# Patient Record
Sex: MALE | Race: BLACK OR AFRICAN AMERICAN | NOT HISPANIC OR LATINO | Employment: FULL TIME | URBAN - METROPOLITAN AREA
[De-identification: names, ages, dates, MRNs, and addresses within clinical notes are randomized per-mention and may not be internally consistent; named-entity substitution may affect disease eponyms.]

---

## 2020-09-02 PROBLEM — I82.4Y2 ACUTE DEEP VEIN THROMBOSIS (DVT) OF PROXIMAL VEIN OF LEFT LOWER EXTREMITY: Status: ACTIVE | Noted: 2020-09-02

## 2020-09-02 PROBLEM — I26.99 ACUTE PULMONARY EMBOLISM WITHOUT ACUTE COR PULMONALE: Status: ACTIVE | Noted: 2020-09-02

## 2021-06-08 PROBLEM — Z79.01 ANTICOAGULATED: Status: ACTIVE | Noted: 2021-06-08

## 2021-06-08 PROBLEM — R74.01 TRANSAMINITIS: Status: ACTIVE | Noted: 2021-06-08

## 2021-06-08 PROBLEM — Z72.0 TOBACCO ABUSE: Status: ACTIVE | Noted: 2021-06-08

## 2022-03-13 PROBLEM — K02.9 DENTAL CARIES: Status: ACTIVE | Noted: 2022-03-13

## 2023-01-13 PROBLEM — Z71.6 TOBACCO ABUSE COUNSELING: Status: ACTIVE | Noted: 2023-01-13

## 2023-01-13 PROBLEM — Z80.0 FAMILY HISTORY OF COLON CANCER: Status: ACTIVE | Noted: 2023-01-13

## 2023-01-15 PROBLEM — D68.59 THROMBOPHILIA: Status: ACTIVE | Noted: 2023-01-15

## 2024-03-13 PROBLEM — I82.4Y2 ACUTE DEEP VEIN THROMBOSIS (DVT) OF PROXIMAL VEIN OF LEFT LOWER EXTREMITY: Status: RESOLVED | Noted: 2020-09-02 | Resolved: 2024-03-13

## 2024-03-13 PROBLEM — I26.09 ACUTE PULMONARY EMBOLISM WITH ACUTE COR PULMONALE: Status: ACTIVE | Noted: 2020-09-02

## 2024-03-14 PROBLEM — J96.01 ACUTE HYPOXEMIC RESPIRATORY FAILURE: Status: ACTIVE | Noted: 2024-03-14

## 2024-03-14 PROBLEM — J96.01 ACUTE HYPOXEMIC RESPIRATORY FAILURE: Status: RESOLVED | Noted: 2024-03-14 | Resolved: 2024-03-14

## 2024-03-14 PROBLEM — R07.9 CHEST PAIN: Status: ACTIVE | Noted: 2024-03-14

## 2024-03-14 PROBLEM — D68.59 PROTEIN S DEFICIENCY: Status: RESOLVED | Noted: 2023-01-15 | Resolved: 2024-03-14

## 2024-03-14 PROBLEM — R06.02 SHORTNESS OF BREATH: Status: ACTIVE | Noted: 2024-03-14

## 2024-03-14 PROBLEM — R07.9 CHEST PAIN: Status: RESOLVED | Noted: 2024-03-14 | Resolved: 2024-03-14

## 2024-03-15 PROBLEM — R06.02 SHORTNESS OF BREATH: Status: ACTIVE | Noted: 2024-03-15

## 2024-06-17 PROBLEM — I26.09 ACUTE PULMONARY EMBOLISM WITH ACUTE COR PULMONALE: Status: RESOLVED | Noted: 2020-09-02 | Resolved: 2024-06-17

## 2024-07-07 PROBLEM — I50.810 RIGHT-SIDED HEART FAILURE: Status: ACTIVE | Noted: 2024-07-07

## 2024-07-07 PROBLEM — I50.813 ACUTE ON CHRONIC RIGHT-SIDED HEART FAILURE: Status: ACTIVE | Noted: 2024-07-07

## 2024-07-07 PROBLEM — I27.20 PULMONARY HYPERTENSION: Status: ACTIVE | Noted: 2024-07-07

## 2024-07-12 PROBLEM — J96.01 ACUTE RESPIRATORY FAILURE WITH HYPOXIA: Status: RESOLVED | Noted: 2024-03-14 | Resolved: 2024-07-12

## 2024-08-08 ENCOUNTER — SOCIAL WORK (OUTPATIENT)
Dept: ADMINISTRATIVE | Facility: OTHER | Age: 54
End: 2024-08-08

## 2024-08-08 NOTE — PROGRESS NOTES
Was asked by MD to assist pt with Eliquis medication due to pt having no insurance and he reports that medication is costing him $600.00 a month. Informed MD that Sw can meet with pt and try to enroll him in a Patient Assistance Program to obtain assistance with medication. Received office visit from pt and discussed with pt about getting him assistance with the Eliquis medication through the [employer] Patient Assistance Program and pt agreed to be assisted. Pt signed the enrollment application and pt was informed that once MD signature is obtained on application it will be submitted to [employer] for review and he will be updated on the response. Pt verbalized understanding. Obtained MD signature on application and faxed to [employer] [phone] for assistance. Will continue to follow pt and assist pt in obtaining assistance with the Eliquis medication.       Alexandra Gupta LMSW    Ext [phone]/Pager [phone]

## 2024-08-12 ENCOUNTER — SOCIAL WORK (OUTPATIENT)
Dept: ADMINISTRATIVE | Facility: OTHER | Age: 54
End: 2024-08-12

## 2024-08-12 NOTE — PROGRESS NOTES
Called [facility] Patient [employer]@[phone], spoke with Diane to follow up with her in regards to receiving pt new application for assistance with the Eliquis and she confirmed that application was received. Diane states they received all information they need and it application will be assigned to a processor. Diane if they have any questions they will reach out. Diane reports that once a determination is made they will fax a letter to the provider office and mail letter to pt. Called [phone] was notified of [employer] receiving his application and it will be reviewed. Informed pt that Sw will call him once a response is received regarding the application. Pt verbalized understanding. No other needs were noted at that time. Will continue to follow pt and assist.       Alexandra Gupta LMSW    Ext [phone]/Pager [phone]

## 2024-08-13 ENCOUNTER — SOCIAL WORK (OUTPATIENT)
Dept: ADMINISTRATIVE | Facility: OTHER | Age: 54
End: 2024-08-13

## 2024-08-13 NOTE — PROGRESS NOTES
Received letter via fax from [employer] informing that pt application for assistance with receiving Eliquis has been approved. Per [facility] due to pt meeting the program's eligibility requirements, he/she is now eligible to receive Eliquis free of charge from 8/13/24 through 8/12/2025. Per the program, pt has been notified via letter of the approval and the enrollment dates listed above. Called [phone] and was notified of his approval for assistance with getting Eliquis free of charge and pt verbalized understanding and offered his sincere appreciation for the assistance. Pt was asked to call and let Sw know when he received the medication. Pt verbalized understanding and states he will.  No other needs were noted at that time. Will continue to follow pt and assist as needed.       Alexandra Gupta [facility]    Ext [phone]/Pager [phone]

## 2024-09-11 ENCOUNTER — SOCIAL WORK (OUTPATIENT)
Dept: ADMINISTRATIVE | Facility: OTHER | Age: 54
End: 2024-09-11

## 2024-09-11 NOTE — PROGRESS NOTES
Tried calling [phone] to follow up with him regards to receiving the Eliquis medication via mail from [employer] Patient Assistance [employer] but pt was not reachable. Unable to leave message due to voicemail being full and not accepting messages. Will continue to follow pt and assist as needed.       Alexandra Gupta LMSW    Ext [phone]/Pager [phone]